# Patient Record
Sex: FEMALE | Race: WHITE | URBAN - METROPOLITAN AREA
[De-identification: names, ages, dates, MRNs, and addresses within clinical notes are randomized per-mention and may not be internally consistent; named-entity substitution may affect disease eponyms.]

---

## 2022-01-26 ENCOUNTER — OFFICE VISIT (OUTPATIENT)
Dept: URBAN - METROPOLITAN AREA CLINIC 13 | Facility: CLINIC | Age: 72
End: 2022-01-26
Payer: MEDICARE

## 2022-01-26 DIAGNOSIS — H25.13 AGE-RELATED NUCLEAR CATARACT, BILATERAL: ICD-10-CM

## 2022-01-26 DIAGNOSIS — H43.811 VITREOUS DETACHMENT OF RT EYE: Primary | ICD-10-CM

## 2022-01-26 PROCEDURE — 99204 OFFICE O/P NEW MOD 45 MIN: CPT | Performed by: OPHTHALMOLOGY

## 2022-01-26 PROCEDURE — 92134 CPTRZ OPH DX IMG PST SGM RTA: CPT | Performed by: OPHTHALMOLOGY

## 2022-01-26 ASSESSMENT — INTRAOCULAR PRESSURE
OD: 9
OS: 10

## 2022-01-26 NOTE — IMPRESSION/PLAN
Impression: Vitreous detachment of rt eye: H43.811. Right. OCT: no ERM/ME OU Plan: Scleral depressed exam reveals no retinal break or detachment. Discussed risk for retinal tear and retinal detachment due to PVD. Reviewed signs and symptoms of a retinal tear and detachment. Advised to return immediately for new floaters, flashing lights or a shadow in the vision. 

Return in 3 weeks, OCT OU

## 2022-01-26 NOTE — IMPRESSION/PLAN
Impression: Myopia, bilateral: H52.13. Plan: Discussed increased risk for developing a retinal tear or retinal detachment secondary to myopia. Advised patient to contact us immediately for any new floaters, flashing lights, or a shadow in the vision.

## 2022-01-26 NOTE — IMPRESSION/PLAN
Impression: Amblyopia of right eye: H53.001. Plan: Observe. [facility] notified of heart rate.      Lauren Quintanilla RN  05/12/19 [id number]

## 2022-02-16 ENCOUNTER — OFFICE VISIT (OUTPATIENT)
Dept: URBAN - METROPOLITAN AREA CLINIC 13 | Facility: CLINIC | Age: 72
End: 2022-02-16
Payer: MEDICARE

## 2022-02-16 DIAGNOSIS — H52.13 MYOPIA, BILATERAL: ICD-10-CM

## 2022-02-16 DIAGNOSIS — H26.9 CATARACT: ICD-10-CM

## 2022-02-16 DIAGNOSIS — H35.3132 NEXDTVE AGE-RELATED MCLR DEGN, BILATERAL, INTERMED DRY STAGE: ICD-10-CM

## 2022-02-16 PROCEDURE — 99214 OFFICE O/P EST MOD 30 MIN: CPT | Performed by: OPHTHALMOLOGY

## 2022-02-16 PROCEDURE — 92134 CPTRZ OPH DX IMG PST SGM RTA: CPT | Performed by: OPHTHALMOLOGY

## 2022-02-16 ASSESSMENT — INTRAOCULAR PRESSURE
OS: 11
OD: 9

## 2022-02-16 NOTE — IMPRESSION/PLAN
Impression: Nexdtve age-related mclr degn, bilateral, intermed dry stage: H35.3132. Plan: Exam/OCT reveals drusen with no evidence of CNV OU. Discussed risk for progression to NV AMD.  Recommend AREDS and james.

## 2022-02-16 NOTE — IMPRESSION/PLAN
Impression: Vitreous hemorrhage, right eye: H43.11. Right. Plan: Resolving VH secondary to PVD OD as above.

## 2022-02-16 NOTE — IMPRESSION/PLAN
Impression: Vitreous detachment of rt eye: H43.811. Right. OCT: no ERM/ME OU Plan: Returns for f/u evaluation of PVD with VH OD. Initially seen on 1/26/22. Reports decreased floaters OD. Scleral depressed exam reveals no retinal break or detachment. Discussed risk for retinal tear and retinal detachment due to PVD. Reviewed signs and symptoms of a retinal tear and detachment. Advised to return immediately for new floaters, flashing lights or a shadow in the vision. 

Return in 6-8 weeks, OCT OU

## 2022-02-16 NOTE — IMPRESSION/PLAN
Impression: Myopia, bilateral: H52.13. Bilateral. Plan: Discussed increased risk for developing a retinal tear or retinal detachment secondary to myopia. Advised patient to contact us immediately for any new floaters, flashing lights, or a shadow in the vision.

## 2022-03-30 ENCOUNTER — OFFICE VISIT (OUTPATIENT)
Dept: URBAN - METROPOLITAN AREA CLINIC 13 | Facility: CLINIC | Age: 72
End: 2022-03-30
Payer: MEDICARE

## 2022-03-30 DIAGNOSIS — H43.11 VITREOUS HEMORRHAGE, RIGHT EYE: ICD-10-CM

## 2022-03-30 DIAGNOSIS — H53.001 AMBLYOPIA OF RIGHT EYE: ICD-10-CM

## 2022-03-30 PROCEDURE — 92134 CPTRZ OPH DX IMG PST SGM RTA: CPT | Performed by: OPHTHALMOLOGY

## 2022-03-30 PROCEDURE — 99213 OFFICE O/P EST LOW 20 MIN: CPT | Performed by: OPHTHALMOLOGY

## 2022-03-30 ASSESSMENT — INTRAOCULAR PRESSURE
OD: 14
OS: 15

## 2022-03-30 NOTE — IMPRESSION/PLAN
Impression: Vitreous detachment of rt eye: H43.811. Right. OCT: no ERM/ME OU Plan: Returns for f/u evaluation of PVD with VH OD. Initially seen on 1/26/22. Reports decreased floaters OD. Scleral depressed exam reveals no retinal break or detachment. Discussed risk for retinal tear and retinal detachment due to PVD. Reviewed signs and symptoms of a retinal tear and detachment. Advised to return immediately for new floaters, flashing lights or a shadow in the vision. 

Return in 8-12 weeks, OCT OU

## 2022-03-30 NOTE — IMPRESSION/PLAN
Impression: Nexdtve age-related mclr degn, bilateral, intermed dry stage: H35.3132. Bilateral. Plan: Exam/OCT reveals drusen with no evidence of CNV OU. Discussed risk for progression to NV AMD.  Recommend AREDS and james.

## 2022-10-08 ENCOUNTER — OFFICE VISIT (OUTPATIENT)
Dept: URBAN - METROPOLITAN AREA CLINIC 7 | Facility: CLINIC | Age: 72
End: 2022-10-08
Payer: MEDICARE

## 2022-10-08 DIAGNOSIS — H43.11 VITREOUS HEMORRHAGE, RIGHT EYE: Primary | ICD-10-CM

## 2022-10-08 PROCEDURE — 92012 INTRM OPH EXAM EST PATIENT: CPT | Performed by: OPHTHALMOLOGY

## 2022-10-08 PROCEDURE — 76512 OPH US DX B-SCAN: CPT | Performed by: OPHTHALMOLOGY

## 2022-10-08 ASSESSMENT — INTRAOCULAR PRESSURE
OD: 11
OS: 13

## 2022-10-08 NOTE — IMPRESSION/PLAN
Impression: Vitreous hemorrhage, right eye: H43.11. Right. B-scan OD 10/08/2022 = no mass/RD Plan: Had VH about 8m ago, seen by [facility] Per notes, secondary to PVD Now with recurrent VH - onset yesterday - no flashes, no h/o trauma No episodes prior to the beginning of this year No view to the back - rec B-scan OD = as above Rec obs for now. May benefit from PPV/poss EL in the future. Would consider Optos FA once VH has cleared, to r/o areas of neovascularization 1-2 weeks DE OD with HAKAN

## 2022-10-13 ENCOUNTER — OFFICE VISIT (OUTPATIENT)
Dept: URBAN - METROPOLITAN AREA CLINIC 13 | Facility: CLINIC | Age: 72
End: 2022-10-13
Payer: MEDICARE

## 2022-10-13 DIAGNOSIS — H52.13 MYOPIA, BILATERAL: ICD-10-CM

## 2022-10-13 DIAGNOSIS — H53.001 AMBLYOPIA OF RIGHT EYE: ICD-10-CM

## 2022-10-13 DIAGNOSIS — H25.13 AGE-RELATED NUCLEAR CATARACT, BILATERAL: ICD-10-CM

## 2022-10-13 DIAGNOSIS — H43.11 VITREOUS HEMORRHAGE, RIGHT EYE: Primary | ICD-10-CM

## 2022-10-13 DIAGNOSIS — H35.3132 NEXDTVE AGE-RELATED MCLR DEGN, BILATERAL, INTERMED DRY STAGE: ICD-10-CM

## 2022-10-13 PROCEDURE — 99214 OFFICE O/P EST MOD 30 MIN: CPT | Performed by: OPHTHALMOLOGY

## 2022-10-13 PROCEDURE — 76512 OPH US DX B-SCAN: CPT | Performed by: OPHTHALMOLOGY

## 2022-10-13 PROCEDURE — 92134 CPTRZ OPH DX IMG PST SGM RTA: CPT | Performed by: OPHTHALMOLOGY

## 2022-10-13 RX ORDER — MOXIFLOXACIN 5 MG/ML
0.5 % SOLUTION/ DROPS OPHTHALMIC
Qty: 5 | Refills: 2 | Status: INACTIVE
Start: 2022-10-13 | End: 2023-01-10

## 2022-10-13 RX ORDER — PREDNISOLONE ACETATE 10 MG/ML
1 % SUSPENSION/ DROPS OPHTHALMIC
Qty: 5 | Refills: 2 | Status: INACTIVE
Start: 2022-10-13 | End: 2023-01-10

## 2022-10-13 ASSESSMENT — INTRAOCULAR PRESSURE
OD: 15
OS: 14

## 2022-10-13 NOTE — IMPRESSION/PLAN
Impression: Vitreous hemorrhage, right eye: H43.11. Plan: Decreased vision OD x 1 week. Dense VH with no view to posterior segment. BScan reveals retina remains attached. Discussed risk for retinal tear secondary to VH/PVD. Recommend surgery to clear VH and identify and treat any retinal tear. Patient elects surgery. 

Plan:  PPV, poss EL/GAS x VH OD

## 2022-10-13 NOTE — IMPRESSION/PLAN
Impression: Nexdtve age-related mclr degn, bilateral, intermed dry stage: H35.3132. Plan: Exam/OCT reveals drusen with no evidence of CNV OS. Discussed risk for progression to NV AMD.  Recommend AREDS and james.

## 2022-10-20 ENCOUNTER — POST-OPERATIVE VISIT (OUTPATIENT)
Dept: URBAN - METROPOLITAN AREA CLINIC 7 | Facility: CLINIC | Age: 72
End: 2022-10-20
Payer: MEDICARE

## 2022-10-20 DIAGNOSIS — H43.11 VITREOUS HEMORRHAGE, RIGHT EYE: Primary | ICD-10-CM

## 2022-10-20 PROCEDURE — 99024 POSTOP FOLLOW-UP VISIT: CPT | Performed by: OPHTHALMOLOGY

## 2022-10-20 ASSESSMENT — INTRAOCULAR PRESSURE
OD: 6
OS: 13

## 2022-10-20 NOTE — IMPRESSION/PLAN
Impression: S/P PPV, poss EL/GAS x VH OD OD - 1 Day. Vitreous hemorrhage, right eye  H43.11. Plan: VH cleared. No infection. IOP satisfactory. Retina attached. Start Oflox/Pred QID OD. Return in 1 week

## 2022-10-27 ENCOUNTER — POST-OPERATIVE VISIT (OUTPATIENT)
Dept: URBAN - METROPOLITAN AREA CLINIC 7 | Facility: CLINIC | Age: 72
End: 2022-10-27
Payer: MEDICARE

## 2022-10-27 DIAGNOSIS — H43.11 VITREOUS HEMORRHAGE, RIGHT EYE: Primary | ICD-10-CM

## 2022-10-27 PROCEDURE — 99024 POSTOP FOLLOW-UP VISIT: CPT | Performed by: OPHTHALMOLOGY

## 2022-10-27 ASSESSMENT — INTRAOCULAR PRESSURE
OS: 13
OD: 13

## 2022-10-27 NOTE — IMPRESSION/PLAN
Impression: S/P PPV, poss EL/GAS x VH OD OD - 8 Days. Vitreous hemorrhage, right eye  H43.11. Plan: Retina remains attached. IOP satisfactory OU. No infection. Patient is doing well. Will discontinue Ofloxacin and taper off Prednisolone weekly. 

Return in 4-6 weeks, OCT OU, OPTOS FA transit OD, OPTOS COLORS OU

## 2022-12-08 ENCOUNTER — POST-OPERATIVE VISIT (OUTPATIENT)
Dept: URBAN - METROPOLITAN AREA CLINIC 13 | Facility: CLINIC | Age: 72
End: 2022-12-08
Payer: MEDICARE

## 2022-12-08 DIAGNOSIS — H43.11 VITREOUS HEMORRHAGE, RIGHT EYE: Primary | ICD-10-CM

## 2022-12-08 DIAGNOSIS — H35.3132 NONEXUDATIVE AGE-RELATED MACULAR DEGENERATION, BILATERAL, INTERMEDIATE DRY STAGE: ICD-10-CM

## 2022-12-08 PROCEDURE — 92134 CPTRZ OPH DX IMG PST SGM RTA: CPT | Performed by: OPHTHALMOLOGY

## 2022-12-08 PROCEDURE — 99024 POSTOP FOLLOW-UP VISIT: CPT | Performed by: OPHTHALMOLOGY

## 2022-12-08 PROCEDURE — 92235 FLUORESCEIN ANGRPH MLTIFRAME: CPT | Performed by: OPHTHALMOLOGY

## 2022-12-08 ASSESSMENT — INTRAOCULAR PRESSURE
OD: 14
OS: 14

## 2022-12-08 NOTE — IMPRESSION/PLAN
Impression: S/P PPV, poss EL/GAS x VH OD OD - 50 Days. Vitreous hemorrhage, right eye  H43.11. Plan: Retina remains attached. No infection. IOP satisfactory OU. Patient is doing well. 

Return PRN, OCT OU

## 2025-08-13 ENCOUNTER — OFFICE VISIT (OUTPATIENT)
Dept: URBAN - METROPOLITAN AREA CLINIC 13 | Facility: CLINIC | Age: 75
End: 2025-08-13
Payer: MEDICARE

## 2025-08-13 DIAGNOSIS — H33.312 HORSESHOE TEAR OF RETINA WITHOUT DETACHMENT, LEFT EYE: ICD-10-CM

## 2025-08-13 DIAGNOSIS — Z96.1 PRESENCE OF PSEUDOPHAKIA: ICD-10-CM

## 2025-08-13 DIAGNOSIS — H43.12 VITREOUS HEMORRHAGE, LEFT EYE: Primary | ICD-10-CM

## 2025-08-13 DIAGNOSIS — H53.001 AMBLYOPIA OF RIGHT EYE: ICD-10-CM

## 2025-08-13 DIAGNOSIS — H35.3132 NEXDTVE AGE-RELATED MCLR DEGN, BILATERAL, INTERMED DRY STAGE: ICD-10-CM

## 2025-08-13 DIAGNOSIS — H43.812 VITREOUS DETACHMENT OF LEFT EYE: ICD-10-CM

## 2025-08-13 DIAGNOSIS — H52.13 MYOPIA, BILATERAL: ICD-10-CM

## 2025-08-13 PROCEDURE — 92250 FUNDUS PHOTOGRAPHY W/I&R: CPT | Performed by: OPHTHALMOLOGY

## 2025-08-13 PROCEDURE — 92134 CPTRZ OPH DX IMG PST SGM RTA: CPT | Performed by: OPHTHALMOLOGY

## 2025-08-13 PROCEDURE — 99214 OFFICE O/P EST MOD 30 MIN: CPT | Performed by: OPHTHALMOLOGY

## 2025-08-13 ASSESSMENT — INTRAOCULAR PRESSURE
OS: 12
OD: 16

## 2025-08-18 ENCOUNTER — OFFICE VISIT (OUTPATIENT)
Dept: URBAN - METROPOLITAN AREA CLINIC 7 | Facility: CLINIC | Age: 75
End: 2025-08-18
Payer: MEDICARE

## 2025-08-18 DIAGNOSIS — H35.3132 NEXDTVE AGE-RELATED MCLR DEGN, BILATERAL, INTERMED DRY STAGE: ICD-10-CM

## 2025-08-18 DIAGNOSIS — H43.812 VITREOUS DETACHMENT OF LEFT EYE: ICD-10-CM

## 2025-08-18 DIAGNOSIS — H53.001 AMBLYOPIA OF RIGHT EYE: ICD-10-CM

## 2025-08-18 DIAGNOSIS — H33.312 HORSESHOE TEAR OF RETINA WITHOUT DETACHMENT, LEFT EYE: ICD-10-CM

## 2025-08-18 DIAGNOSIS — Z96.1 PRESENCE OF PSEUDOPHAKIA: ICD-10-CM

## 2025-08-18 DIAGNOSIS — H52.13 MYOPIA, BILATERAL: ICD-10-CM

## 2025-08-18 DIAGNOSIS — H43.12 VITREOUS HEMORRHAGE, LEFT EYE: Primary | ICD-10-CM

## 2025-08-18 PROCEDURE — 99215 OFFICE O/P EST HI 40 MIN: CPT | Performed by: OPHTHALMOLOGY

## 2025-08-18 PROCEDURE — 92134 CPTRZ OPH DX IMG PST SGM RTA: CPT | Performed by: OPHTHALMOLOGY

## 2025-08-18 PROCEDURE — 76512 OPH US DX B-SCAN: CPT | Performed by: OPHTHALMOLOGY

## 2025-08-18 RX ORDER — PREDNISOLONE ACETATE 10 MG/ML
1 % SUSPENSION/ DROPS OPHTHALMIC
Qty: 5 | Refills: 3 | Status: ACTIVE
Start: 2025-08-18

## 2025-08-18 RX ORDER — OFLOXACIN 3 MG/ML
0.3 % SOLUTION/ DROPS OPHTHALMIC
Qty: 5 | Refills: 0 | Status: ACTIVE
Start: 2025-08-18

## 2025-08-18 ASSESSMENT — INTRAOCULAR PRESSURE
OD: 17
OS: 13

## 2025-08-20 ENCOUNTER — POST-OPERATIVE VISIT (OUTPATIENT)
Dept: URBAN - METROPOLITAN AREA CLINIC 13 | Facility: CLINIC | Age: 75
End: 2025-08-20
Payer: MEDICARE

## 2025-08-20 PROCEDURE — 99024 POSTOP FOLLOW-UP VISIT: CPT | Performed by: OPHTHALMOLOGY

## 2025-08-20 ASSESSMENT — INTRAOCULAR PRESSURE
OD: 18
OS: 20

## 2025-08-26 ENCOUNTER — POST-OPERATIVE VISIT (OUTPATIENT)
Dept: URBAN - METROPOLITAN AREA CLINIC 13 | Facility: CLINIC | Age: 75
End: 2025-08-26
Payer: MEDICARE

## 2025-08-26 DIAGNOSIS — H43.12 VITREOUS HEMORRHAGE, LEFT EYE: Primary | ICD-10-CM

## 2025-08-26 PROCEDURE — 99024 POSTOP FOLLOW-UP VISIT: CPT | Performed by: OPHTHALMOLOGY

## 2025-08-26 ASSESSMENT — INTRAOCULAR PRESSURE
OD: 12
OS: 13